# Patient Record
Sex: MALE | URBAN - METROPOLITAN AREA
[De-identification: names, ages, dates, MRNs, and addresses within clinical notes are randomized per-mention and may not be internally consistent; named-entity substitution may affect disease eponyms.]

---

## 2017-09-13 ENCOUNTER — OFFICE VISIT (OUTPATIENT)
Dept: OPHTHALMOLOGY | Facility: CLINIC | Age: 82
End: 2017-09-13

## 2017-09-13 DIAGNOSIS — H35.3190 NONEXUDATIVE SENILE MACULAR DEGENERATION OF RETINA: Primary | ICD-10-CM

## 2017-09-13 DIAGNOSIS — H52.223 REGULAR ASTIGMATISM, BILATERAL: ICD-10-CM

## 2017-09-13 ASSESSMENT — REFRACTION
OD_AXIS: 015
OD_SPHERE: -2.25
OD_CYLINDER: +3.75

## 2017-09-13 ASSESSMENT — REFRACTION_MANIFEST
OS_AXIS: 175
OD_AXIS: 165
OD_CYLINDER: +3.25
OS_CYLINDER: +4.75
OD_SPHERE: -2.00
OS_SPHERE: -2.25

## 2017-09-13 ASSESSMENT — REFRACTION_WEARINGRX
OD_ADD: +2.75
OS_SPHERE: -2.00
OD_AXIS: 018
OS_CYLINDER: +4.50
OD_CYLINDER: +3.75
OD_SPHERE: -1.75
OS_AXIS: 178
OS_ADD: +2.75
SPECS_TYPE: TRIFOCAL

## 2017-09-13 ASSESSMENT — VISUAL ACUITY
OD_CC: 20/50
OS_CC: 20/30
OD_CC+: +2
OS_CC+: -2+1
CORRECTION_TYPE: GLASSES
METHOD: SNELLEN - LINEAR

## 2017-09-13 ASSESSMENT — TONOMETRY
IOP_METHOD: ICARE
OD_IOP_MMHG: 15
OS_IOP_MMHG: 14

## 2017-09-13 ASSESSMENT — SLIT LAMP EXAM - LIDS
COMMENTS: NORMAL
COMMENTS: NORMAL

## 2017-09-13 ASSESSMENT — CUP TO DISC RATIO
OD_RATIO: 0.3
OS_RATIO: 0.3

## 2017-09-13 ASSESSMENT — CONF VISUAL FIELD
OD_NORMAL: 1
OS_NORMAL: 1
METHOD: COUNTING FINGERS

## 2017-09-13 ASSESSMENT — EXTERNAL EXAM - LEFT EYE: OS_EXAM: NORMAL

## 2017-09-13 ASSESSMENT — EXTERNAL EXAM - RIGHT EYE: OD_EXAM: NORMAL

## 2017-09-13 NOTE — PROGRESS NOTES
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 or sooner as needed.    Complete documentation of historical and exam elements from today's encounter can  be found in the full encounter summary report (not reduplicated in this progress  note). I personally obtained the chief complaint(s) and history of present illness. I  confirmed and edited as necessary the review of systems, past medical/surgical  history, family history, social history, and examination findings as documented by  others; and I examined the patient myself. I personally reviewed the relevant tests,  images, and reports as documented above. I formulated and edited as necessary the  assessment and plan and discussed the findings and management plan with the  patient and family.    GERALDO Hogue M.D

## 2017-09-13 NOTE — MR AVS SNAPSHOT
After Visit Summary   2017    Armando Kennedy    MRN: [id number]           Patient Information     Date Of Birth          1932        Visit Information        Provider Department      2017 1:20 PM Wilder Hogue MD [facility]        Today's Diagnoses     Nonexudative senile macular degeneration of retina    -  1    Regular astigmatism, bilateral           Follow-ups after your visit        Follow-up notes from your care team     Return in about 1 year (around 2018) for Complete Eye Exam, ARMD.      Who to contact     Please call your clinic at [phone] to:    Ask questions about your health    Make or cancel appointments    Discuss your medicines    Learn about your test results    Speak to your doctor   If you have compliments or concerns about an experience at your clinic, or if you wish to file a complaint, please contact [facility] Patient Relations at [phone] or email us at [email or web address]         Additional Information About Your Visit        MyChart Information     Visiarc is an electronic gateway that provides easy, online access to your medical records. With Visiarc, you can request a clinic appointment, read your test results, renew a prescription or communicate with your care team.     To sign up for Visiarc visit the website at [email or web address]   You will be asked to enter the access code listed below, as well as some personal information. Please follow the directions to create your username and password.     Your access code is: [email or web address]  Expires: 2017  6:30 AM     Your access code will  in 90 days. If you need help or a new code, please contact your [facility] or call [phone] for assistance.        Care EveryWhere ID     This is your Care EveryWhere ID. This could be used by other organizations to access your  [facility] medical records  [id number]         Blood Pressure from Last 3 Encounters:   No data found for BP    Weight from Last 3 Encounters:   No data found for Wt              Today, you had the following     No orders found for display       Primary Care Provider    None Specified       No primary provider on file.        Equal Access to Services     NIMESHDEANGELO YOLA : Hadii aad ku hadradhao Socarolinaali, waaxda luqadaha, qaybta kaalmada adeegyada, flori thurstonn melchor barboza laIsabelsilke . So [facility] [phone].    ATENCIÓN: Si habla español, tiene a marquez disposición servicios gratuitos de asistencia lingüística. Llame al [phone].    We comply with applicable federal civil rights laws and [place] laws. We do not discriminate on the basis of race, color, national origin, age, disability sex, sexual orientation or gender identity.            Thank you!     Thank you for choosing [facility]  for your care. Our goal is always to provide you with excellent care. Hearing back from our patients is one way we can continue to improve our services. Please take a few minutes to complete the written survey that you may receive in the mail after your visit with us. Thank you!             Your Updated Medication List - Protect others around you: Learn how to safely use, store and throw away your medicines at www.disposemymeds.org.          This list is accurate as of: 9/13/17  2:14 PM.  Always use your most recent med list.                   Brand Name Dispense Instructions for use Diagnosis    ASPIRIN PO           ATENOLOL PO           CALCIUM + D PO           DEXEDRINE PO           * ICAPS AREDS 2 PO           * MULTIVITAMIN ADULT PO           VITAMIN B COMPLEX IJ           * Notice:  This list has 2 medication(s) that are the same as other medications prescribed for you. Read the directions carefully, and ask your doctor or other care provider to review them with you.

## 2017-09-13 NOTE — NURSING NOTE
Chief Complaints and History of Present Illnesses   Patient presents with     COMPREHENSIVE EYE EXAM     HPI    Affected eye(s):  Both   Symptoms:     Decreased vision (Comment: distance)   Difficulty with reading (Comment: has to hold reading material too close)   Tearing (Comment: excessive for last 2 weeks)      Duration:  6 months   Frequency:  Constant       Do you have eye pain now?:  No      Comments:  Bothered quite a bit by above signs/symptoms.    Funmilayo Kent COT 1:36 PM 09/13/2017